# Patient Record
Sex: FEMALE | Race: BLACK OR AFRICAN AMERICAN | NOT HISPANIC OR LATINO | Employment: OTHER | URBAN - METROPOLITAN AREA
[De-identification: names, ages, dates, MRNs, and addresses within clinical notes are randomized per-mention and may not be internally consistent; named-entity substitution may affect disease eponyms.]

---

## 2020-09-24 ENCOUNTER — TELEPHONE (OUTPATIENT)
Dept: OPHTHALMOLOGY | Facility: CLINIC | Age: 70
End: 2020-09-24

## 2020-09-24 NOTE — TELEPHONE ENCOUNTER
The patient called in today. The patient states that she has been having a foreign body sensation in her right eye for a couple of years. The patient states that her eyes itch. She also has an off white discharge from hey eyes in the morning. She also feels that nasally by her right eye is more prominent than her left eye. I offered the patient an appointment to see one of the doctors today or tomorrow, but the patient declined. The patient will call the on call doctor if something changes. Per Dr. Ayers, the patient can use Pataday 2 times a day until the patient sees  on Monday.

## 2020-09-28 ENCOUNTER — OFFICE VISIT (OUTPATIENT)
Dept: OPHTHALMOLOGY | Facility: CLINIC | Age: 70
End: 2020-09-28
Payer: COMMERCIAL

## 2020-09-28 DIAGNOSIS — H01.00B BLEPHARITIS OF UPPER AND LOWER EYELIDS OF BOTH EYES, UNSPECIFIED TYPE: Primary | ICD-10-CM

## 2020-09-28 DIAGNOSIS — H04.129 DRY EYE: ICD-10-CM

## 2020-09-28 DIAGNOSIS — H01.00A BLEPHARITIS OF UPPER AND LOWER EYELIDS OF BOTH EYES, UNSPECIFIED TYPE: Primary | ICD-10-CM

## 2020-09-28 DIAGNOSIS — H26.9 CORTICAL CATARACT OF BOTH EYES: ICD-10-CM

## 2020-09-28 DIAGNOSIS — H25.13 NUCLEAR SCLEROSIS OF BOTH EYES: ICD-10-CM

## 2020-09-28 DIAGNOSIS — H10.13 ALLERGIC CONJUNCTIVITIS OF BOTH EYES: ICD-10-CM

## 2020-09-28 PROCEDURE — 99999 PR PBB SHADOW E&M-EST. PATIENT-LVL I: ICD-10-PCS | Mod: PBBFAC,,, | Performed by: OPHTHALMOLOGY

## 2020-09-28 PROCEDURE — 99999 PR PBB SHADOW E&M-EST. PATIENT-LVL I: CPT | Mod: PBBFAC,,, | Performed by: OPHTHALMOLOGY

## 2020-09-28 PROCEDURE — 92002 PR EYE EXAM, NEW PATIENT,INTERMED: ICD-10-PCS | Mod: S$PBB,,, | Performed by: OPHTHALMOLOGY

## 2020-09-28 PROCEDURE — 92002 INTRM OPH EXAM NEW PATIENT: CPT | Mod: S$PBB,,, | Performed by: OPHTHALMOLOGY

## 2020-09-28 PROCEDURE — 99211 OFF/OP EST MAY X REQ PHY/QHP: CPT | Mod: PBBFAC | Performed by: OPHTHALMOLOGY

## 2020-09-28 NOTE — PROGRESS NOTES
SUBJECTIVE  Cherelle Arreola is 70 y.o. female  Corrected distance visual acuity was 20/50 -2 in the right eye and 20/30 -1 in the left eye.   Chief Complaint   Patient presents with    Eye Problem          HPI     Foreign body sensation right eye, going on for years, worse in the   mornings    Pataday OU    Last edited by Derrell Berry MD on 9/28/2020 10:38 AM.   (History)         Assessment /Plan :  1. Blepharitis of upper and lower eyelids of both eyes, unspecified type Findings and symptoms consistent with blepharitis. The blepharitis instruction sheet was reviewed with the pt, recommending:Warm compresses, Gentle Lid Scrubs and [place] 3 Fish Oils [id number] mg po bid     2. Dry eye Findings and symptoms consistent with dry eyes. Dry eye instruction sheet reviewed with patient recommending:AT's qid/titrate prn     3. Allergic conjunctivitis of both eyes continue Pataday qd OU prn   4. Nuclear sclerosis of both eyes monitor for now   5. Cortical cataract of both eyes monitor for now     RTC for a cataract evaluation prn

## 2020-11-23 ENCOUNTER — TELEPHONE (OUTPATIENT)
Dept: OPHTHALMOLOGY | Facility: CLINIC | Age: 70
End: 2020-11-23

## 2020-11-23 NOTE — TELEPHONE ENCOUNTER
Patient was wondering why her appt was rescheduled. Let pt know that [facility] is off all week and they tried to reach her 10/07 but had no luck. Patient states that she did receive the letter saying they rescheduled her appointment she just wanted to make sure they had the right number on fine.

## 2020-11-23 NOTE — TELEPHONE ENCOUNTER
----- Message from Abril Lopez sent at 11/23/2020  2:41 PM CST -----  Regarding: why was her appt reschedule  Contact: pt  Caller is requesting a  call back regarding why was her appt reschedule..  Please call back at [phone] (home) .  Thanks.

## 2020-11-30 ENCOUNTER — OFFICE VISIT (OUTPATIENT)
Dept: OPHTHALMOLOGY | Facility: CLINIC | Age: 70
End: 2020-11-30
Payer: COMMERCIAL

## 2020-11-30 DIAGNOSIS — H26.9 CORTICAL CATARACT OF BOTH EYES: ICD-10-CM

## 2020-11-30 DIAGNOSIS — H25.13 NUCLEAR SCLEROSIS OF BOTH EYES: Primary | ICD-10-CM

## 2020-11-30 PROCEDURE — 92014 COMPRE OPH EXAM EST PT 1/>: CPT | Mod: S$PBB,,, | Performed by: OPHTHALMOLOGY

## 2020-11-30 PROCEDURE — 99212 OFFICE O/P EST SF 10 MIN: CPT | Mod: PBBFAC | Performed by: OPHTHALMOLOGY

## 2020-11-30 PROCEDURE — 92014 PR EYE EXAM, EST PATIENT,COMPREHESV: ICD-10-PCS | Mod: S$PBB,,, | Performed by: OPHTHALMOLOGY

## 2020-11-30 PROCEDURE — 99999 PR PBB SHADOW E&M-EST. PATIENT-LVL II: CPT | Mod: PBBFAC,,, | Performed by: OPHTHALMOLOGY

## 2020-11-30 PROCEDURE — 99999 PR PBB SHADOW E&M-EST. PATIENT-LVL II: ICD-10-PCS | Mod: PBBFAC,,, | Performed by: OPHTHALMOLOGY

## 2020-11-30 RX ORDER — IBUPROFEN 100 MG/5ML
1000 SUSPENSION, ORAL (FINAL DOSE FORM) ORAL
COMMUNITY

## 2020-11-30 NOTE — PROGRESS NOTES
BLAISE Arreola is 70 y.o. female  Uncorrected distance visual acuity was 20/25 in the right eye and 20/40 in the left eye.   Chief Complaint   Patient presents with    Cataract          HPI     Pt here for cat eval. Pt states she is still experiencing some discomfort   from the dry eyes, but doesn't have the gritty or fb sensation anymore.   She also doesn't have thick discharge anymore. She still has trouble   seeing. Pt states glare issues stop her from driving at night often.     1. NS OU  2. Dry eyes  3. Blepharitis OU    Last edited by Reagan Perry, Patient Care Assistant on 11/30/2020  8:23   AM. (History)         Assessment /Plan :  1. Nuclear sclerosis of both eyes Patient does reports mild visual decline from incipient cataractous changes, but not sufficient to affect activities of daily living. I recommend monitoring visual status and follow up when visual symptoms worsen.     2. Cortical cataract of both eyes Patient does reports mild visual decline from incipient cataractous changes, but not sufficient to affect activities of daily living. I recommend monitoring visual status and follow up when visual symptoms worsen.       Return to clinic in 6 months to evaluate the cataracts  or as needed

## 2021-01-19 ENCOUNTER — IMMUNIZATION (OUTPATIENT)
Dept: INTERNAL MEDICINE | Facility: CLINIC | Age: 71
End: 2021-01-19
Payer: MEDICARE

## 2021-01-19 DIAGNOSIS — Z23 NEED FOR VACCINATION: Primary | ICD-10-CM

## 2021-01-19 PROCEDURE — 91300 COVID-19, MRNA, LNP-S, PF, 30 MCG/0.3 ML DOSE VACCINE: CPT | Mod: PBBFAC

## 2021-02-09 ENCOUNTER — IMMUNIZATION (OUTPATIENT)
Dept: INTERNAL MEDICINE | Facility: CLINIC | Age: 71
End: 2021-02-09
Payer: MEDICARE

## 2021-02-09 DIAGNOSIS — Z23 NEED FOR VACCINATION: Primary | ICD-10-CM

## 2021-02-09 PROCEDURE — 0002A COVID-19, MRNA, LNP-S, PF, 30 MCG/0.3 ML DOSE VACCINE: CPT | Mod: PBBFAC

## 2021-02-09 PROCEDURE — 91300 COVID-19, MRNA, LNP-S, PF, 30 MCG/0.3 ML DOSE VACCINE: CPT | Mod: PBBFAC

## 2021-06-11 ENCOUNTER — OFFICE VISIT (OUTPATIENT)
Dept: OPHTHALMOLOGY | Facility: CLINIC | Age: 71
End: 2021-06-11
Payer: MEDICARE

## 2021-06-11 DIAGNOSIS — H52.7 REFRACTIVE ERROR: ICD-10-CM

## 2021-06-11 DIAGNOSIS — H26.9 CORTICAL CATARACT OF BOTH EYES: ICD-10-CM

## 2021-06-11 DIAGNOSIS — H25.13 NUCLEAR SCLEROSIS OF BOTH EYES: Primary | ICD-10-CM

## 2021-06-11 DIAGNOSIS — Z83.511 FAMILY HISTORY OF GLAUCOMA: ICD-10-CM

## 2021-06-11 PROCEDURE — 99999 PR PBB SHADOW E&M-EST. PATIENT-LVL II: CPT | Mod: PBBFAC,,, | Performed by: OPHTHALMOLOGY

## 2021-06-11 PROCEDURE — 99999 PR PBB SHADOW E&M-EST. PATIENT-LVL II: ICD-10-PCS | Mod: PBBFAC,,, | Performed by: OPHTHALMOLOGY

## 2021-06-11 PROCEDURE — 92014 PR EYE EXAM, EST PATIENT,COMPREHESV: ICD-10-PCS | Mod: S$PBB,,, | Performed by: OPHTHALMOLOGY

## 2021-06-11 PROCEDURE — 92015 PR REFRACTION: ICD-10-PCS | Mod: ,,, | Performed by: OPHTHALMOLOGY

## 2021-06-11 PROCEDURE — 92015 DETERMINE REFRACTIVE STATE: CPT | Mod: ,,, | Performed by: OPHTHALMOLOGY

## 2021-06-11 PROCEDURE — 92014 COMPRE OPH EXAM EST PT 1/>: CPT | Mod: S$PBB,,, | Performed by: OPHTHALMOLOGY

## 2021-06-11 PROCEDURE — 99212 OFFICE O/P EST SF 10 MIN: CPT | Mod: PBBFAC | Performed by: OPHTHALMOLOGY

## 2021-11-06 ENCOUNTER — IMMUNIZATION (OUTPATIENT)
Dept: PRIMARY CARE CLINIC | Facility: CLINIC | Age: 71
End: 2021-11-06
Payer: MEDICARE

## 2021-11-06 DIAGNOSIS — Z23 NEED FOR VACCINATION: Primary | ICD-10-CM

## 2021-11-06 PROCEDURE — 0004A COVID-19, MRNA, LNP-S, PF, 30 MCG/0.3 ML DOSE VACCINE: CPT | Mod: CV19,PBBFAC | Performed by: FAMILY MEDICINE

## 2021-12-06 ENCOUNTER — OFFICE VISIT (OUTPATIENT)
Dept: OPHTHALMOLOGY | Facility: CLINIC | Age: 71
End: 2021-12-06
Payer: MEDICARE

## 2021-12-06 DIAGNOSIS — H26.9 CORTICAL CATARACT OF BOTH EYES: ICD-10-CM

## 2021-12-06 DIAGNOSIS — Z83.511 FAMILY HISTORY OF GLAUCOMA: ICD-10-CM

## 2021-12-06 DIAGNOSIS — H25.13 NUCLEAR SCLEROSIS OF BOTH EYES: Primary | ICD-10-CM

## 2021-12-06 PROCEDURE — 92012 INTRM OPH EXAM EST PATIENT: CPT | Mod: S$PBB,,, | Performed by: OPHTHALMOLOGY

## 2021-12-06 PROCEDURE — 99999 PR PBB SHADOW E&M-EST. PATIENT-LVL II: CPT | Mod: PBBFAC,,, | Performed by: OPHTHALMOLOGY

## 2021-12-06 PROCEDURE — 99999 PR PBB SHADOW E&M-EST. PATIENT-LVL II: ICD-10-PCS | Mod: PBBFAC,,, | Performed by: OPHTHALMOLOGY

## 2021-12-06 PROCEDURE — 92012 PR EYE EXAM, EST PATIENT,INTERMED: ICD-10-PCS | Mod: S$PBB,,, | Performed by: OPHTHALMOLOGY

## 2021-12-06 PROCEDURE — 99212 OFFICE O/P EST SF 10 MIN: CPT | Mod: PBBFAC | Performed by: OPHTHALMOLOGY

## 2022-06-27 ENCOUNTER — IMMUNIZATION (OUTPATIENT)
Dept: PRIMARY CARE CLINIC | Facility: CLINIC | Age: 72
End: 2022-06-27
Payer: MEDICARE

## 2022-06-27 DIAGNOSIS — Z23 NEED FOR VACCINATION: Primary | ICD-10-CM

## 2022-06-27 PROCEDURE — 91305 COVID-19, MRNA, LNP-S, PF, 30 MCG/0.3 ML DOSE VACCINE (PFIZER): CPT | Mod: PBBFAC | Performed by: FAMILY MEDICINE

## 2023-01-23 ENCOUNTER — TELEPHONE (OUTPATIENT)
Dept: OBSTETRICS AND GYNECOLOGY | Facility: CLINIC | Age: 73
End: 2023-01-23
Payer: MEDICARE

## 2023-01-23 NOTE — TELEPHONE ENCOUNTER
----- Message from Makayla Ayon sent at 1/23/2023  2:30 PM CST -----  Shea with [facility] would like to consult with a nurse. The patient is currently at [facility] stating she gets her mammo orders from  I informed nurse the patient hasn't been seen in office since 2016' they are wanting to speak with office to confirm. Please call back at [phone]. Thanks r/s and fax [phone]

## 2023-01-23 NOTE — TELEPHONE ENCOUNTER
Called [facility] and advised patient has not been seen since 2016 and will need annual appointment.  PCP may have been writing orders for mammo, but it was not Dr. Howell.

## 2024-11-20 ENCOUNTER — TELEPHONE (OUTPATIENT)
Dept: OPHTHALMOLOGY | Facility: CLINIC | Age: 74
End: 2024-11-20
Payer: MEDICARE

## 2024-11-20 NOTE — TELEPHONE ENCOUNTER
I returned call to Ms. Arreola.  She did not answer.  I left a voicemail stating that we can schedule her with Dr. Cancino or Dr. Vega and she can give us a call back to let us know If that is ok and get her scheduled.

## 2024-11-20 NOTE — TELEPHONE ENCOUNTER
----- Message from [employer] sent at 11/20/2024  3:50 PM CST -----  Contact: SUN HERNÁNDEZ [[id number]]  .Type:  Patient Requesting Call    Who Called:SUN HERNÁNDEZ [[id number]]  Does the patient know what this is regarding?:scheduling  Would the patient rather a call back or a response via [facility]? call  Best Call Back Number:.[phone] (home)     Additional Information:

## 2025-04-17 ENCOUNTER — TELEPHONE (OUTPATIENT)
Dept: OPHTHALMOLOGY | Facility: CLINIC | Age: 75
End: 2025-04-17
Payer: MEDICARE

## 2025-04-17 NOTE — TELEPHONE ENCOUNTER
----- Message from Escobar sent at 4/17/2025  2:10 PM CDT -----  Contact: Pt  .Type:  Sooner Apoointment RequestCaller is requesting a sooner appointment.  Caller declined first available appointment listed below.  Caller will not accept being placed on the waitlist and is requesting a message be sent to doctor.Name of Caller:ptWhen is the first available appointment?Symptoms: routine eye examWould the patient rather a call back or a response via [facility]?  Call [facility] Call Back Number: [phone] Additional Information:

## 2025-04-17 NOTE — TELEPHONE ENCOUNTER
Called pt back and left a message to reschedule with another on e of her doctors due to [facility] retiring.

## 2025-04-28 ENCOUNTER — OFFICE VISIT (OUTPATIENT)
Dept: OTOLARYNGOLOGY | Facility: CLINIC | Age: 75
End: 2025-04-28
Payer: MEDICARE

## 2025-04-28 VITALS — HEIGHT: 63 IN | BODY MASS INDEX: 32.41 KG/M2

## 2025-04-28 DIAGNOSIS — R13.10 DYSPHAGIA, UNSPECIFIED TYPE: ICD-10-CM

## 2025-04-28 DIAGNOSIS — K21.9 GASTROESOPHAGEAL REFLUX DISEASE WITHOUT ESOPHAGITIS: ICD-10-CM

## 2025-04-28 DIAGNOSIS — J02.9 SORE THROAT: Primary | ICD-10-CM

## 2025-04-28 PROCEDURE — 99203 OFFICE O/P NEW LOW 30 MIN: CPT | Mod: S$PBB,,, | Performed by: PHYSICIAN ASSISTANT

## 2025-04-28 PROCEDURE — 99999 PR PBB SHADOW E&M-EST. PATIENT-LVL III: CPT | Mod: PBBFAC,,, | Performed by: PHYSICIAN ASSISTANT

## 2025-04-28 PROCEDURE — 99213 OFFICE O/P EST LOW 20 MIN: CPT | Mod: PBBFAC | Performed by: PHYSICIAN ASSISTANT

## 2025-04-28 NOTE — PROGRESS NOTES
"Subjective:   Patient ID: Cherelle Arreola is a 75 y.o. female.    Chief Complaint: Sore Throat (Ongoing for about a couple of months. She states she would wake up at night and it feels like someone is cutting her throat. Her throat feels very raw.)    Ms. Arreola is a very pleasant 74 yo female here to see em today with burning sensation in throat at night, occasional pain with swallowing and coughing x 3-4 months. She does have a h/o GERD and is managed with prilosec. She quit smoking 30+ years ago. She occasionally coughs up "thick mucus."     Sore Throat   Pertinent negatives include no abdominal pain, congestion, coughing, ear discharge, ear pain, headaches, shortness of breath or trouble swallowing.     Review of patient's allergies indicates:   Allergen Reactions    Ace inhibitors Other (See Comments)    Penicillins Other (See Comments)    Sulfamethoxazole-trimethoprim Other (See Comments)           Review of Systems   Constitutional:  Negative for chills, fatigue, fever and unexpected weight change.   HENT:  Positive for sore throat. Negative for congestion, dental problem, ear discharge, ear pain, facial swelling, hearing loss, nosebleeds, postnasal drip, rhinorrhea, sinus pressure, sneezing, tinnitus, trouble swallowing and voice change.    Eyes:  Negative for redness, itching and visual disturbance.   Respiratory:  Negative for cough, choking, shortness of breath and wheezing.    Cardiovascular:  Negative for chest pain and palpitations.   Gastrointestinal:  Negative for abdominal pain.        No reflux.   Musculoskeletal:  Negative for gait problem.   Skin:  Negative for rash.   Neurological:  Negative for dizziness, light-headedness and headaches.         Objective:   Ht 5' 3" (1.6 m)   BMI 32.41 kg/m²     Physical Exam  Constitutional:       General: She is not in acute distress.     Appearance: She is well-developed.   HENT:      Head: Normocephalic and atraumatic.      Right Ear: Tympanic membrane, ear " canal and external ear normal.      Left Ear: Tympanic membrane, ear canal and external ear normal.      Nose: Nose normal. No nasal deformity, septal deviation, mucosal edema or rhinorrhea.      Right Sinus: No maxillary sinus tenderness or frontal sinus tenderness.      Left Sinus: No maxillary sinus tenderness or frontal sinus tenderness.      Mouth/Throat:      Mouth: Mucous membranes are not pale and not dry.      Dentition: No dental caries.      Pharynx: Uvula midline. No oropharyngeal exudate or posterior oropharyngeal erythema.   Eyes:      General: Lids are normal. No scleral icterus.     Extraocular Movements:      Right eye: Normal extraocular motion and no nystagmus.      Left eye: Normal extraocular motion and no nystagmus.      Conjunctiva/sclera: Conjunctivae normal.      Right eye: Right conjunctiva is not injected. No chemosis.     Left eye: Left conjunctiva is not injected. No chemosis.     Pupils: Pupils are equal, round, and reactive to light.   Neck:      Thyroid: No thyroid mass or thyromegaly.      Trachea: Trachea and phonation normal. No tracheal tenderness or tracheal deviation.   Pulmonary:      Effort: Pulmonary effort is normal. No respiratory distress.      Breath sounds: No stridor.   Abdominal:      General: There is no distension.   Lymphadenopathy:      Head:      Right side of head: No submental, submandibular, preauricular, posterior auricular or occipital adenopathy.      Left side of head: No submental, submandibular, preauricular, posterior auricular or occipital adenopathy.      Cervical: No cervical adenopathy.   Skin:     General: Skin is warm and dry.      Findings: No erythema or rash.   Neurological:      Mental Status: She is alert and oriented to person, place, and time.      Cranial Nerves: No cranial nerve deficit.   Psychiatric:         Behavior: Behavior normal.              Assessment:     1. Sore throat    2. Gastroesophageal reflux disease without esophagitis     3. Dysphagia, unspecified type        Plan:     Sore throat    Gastroesophageal reflux disease without esophagitis    Dysphagia, unspecified type      Will plan to have her return for scope evaluation. Discussed reflux precautions. May need increase in medications.

## 2025-05-08 ENCOUNTER — OFFICE VISIT (OUTPATIENT)
Dept: OTOLARYNGOLOGY | Facility: CLINIC | Age: 75
End: 2025-05-08
Payer: MEDICARE

## 2025-05-08 VITALS — HEIGHT: 63 IN | BODY MASS INDEX: 32.41 KG/M2

## 2025-05-08 DIAGNOSIS — R13.10 DYSPHAGIA, UNSPECIFIED TYPE: ICD-10-CM

## 2025-05-08 DIAGNOSIS — K21.9 LARYNGOPHARYNGEAL REFLUX (LPR): Primary | ICD-10-CM

## 2025-05-08 DIAGNOSIS — J02.9 SORE THROAT: ICD-10-CM

## 2025-05-08 PROCEDURE — 31575 DIAGNOSTIC LARYNGOSCOPY: CPT | Mod: PBBFAC | Performed by: OTOLARYNGOLOGY

## 2025-05-08 PROCEDURE — 99213 OFFICE O/P EST LOW 20 MIN: CPT | Mod: PBBFAC | Performed by: OTOLARYNGOLOGY

## 2025-05-08 PROCEDURE — 99999 PR PBB SHADOW E&M-EST. PATIENT-LVL III: CPT | Mod: PBBFAC,,, | Performed by: OTOLARYNGOLOGY

## 2025-05-08 RX ORDER — PANTOPRAZOLE SODIUM 40 MG/1
40 TABLET, DELAYED RELEASE ORAL DAILY
Qty: 30 TABLET | Refills: 5 | Status: SHIPPED | OUTPATIENT
Start: 2025-05-08 | End: 2026-05-08

## 2025-05-08 NOTE — PATIENT INSTRUCTIONS
Laryngopharyngeal Reflux (LPR) Patient Information and Medication Instructions    LPR (laryngopharyngeal reflux) can be a challenging condition to control.  Some patients require once daily medication like a proton pump inhibitor to control their symptoms (such as Omeprazole, Pantoprazole, Esomeprazole).  HOWEVER, other patients will require taking this medication twice daily and even may be started on another medication called an H2 blocker (such as Pepcid, Zantac) at bedtime.    It is important that medication is not the only technique that you use to help control your LPR.  Therapeutic lifestyle changes are very important as well:     Weight Loss:  If you are overweight, losing weight can significantly reduce your reflux.  Diet Control:  It is important to determine what your Trigger foods for reflux are.  Limiting your intake of these foods will significantly improve your reflux.  Examples of foods known to increase reflux are listed below  Carbonated beverages  Caffeine (this includes Coffee, soda, iced tea, energy drinks etc.)  Alcohol  Fried/ fatty/ greasy foods  Acidic foods (citrus, tomato etc.)  Chocolate  Spearmint/Peppermint  Elevating the head of your bed:  This doesn't mean more pillows.  You need to actually elevate the head of your bed.  Some patients have found something to put under the legs of the head of their bed.  Other patients have employed a wedge or an air bladder of some sort to elevate the head of their bed.  This makes a significant difference with reflux and can also help with nasal congestion.  Eating before bedtime:  Try not to eat anything for at least 2 hours before bedtime.  This allows for less material to remain in your stomach when you lay down at night which equals less severe reflux.  More information:  If you would like to read more about diet changes and lifestyle changes that you can employ that will help with your reflux, the books below may be helpful.  Dropping Acid:   The Reflux diet Cookbook & Cure by Ravinder Flynn M.D. and Elmer Epperson M.D.  The Acid Watcher Diet:  A 28 Day Reflux Prevention and Healing Program by Reagan Snowden M.D.      Weaning Instructions After Symptom Improvement    It can sometimes take up to 12 weeks to see symptom improvement in LPR.  The medications may reduce the amount of acid you are producing very quickly, but then the tissues of your voice box and upper throat have to heal themselves.  Your physician may have increased year proton pump inhibitor to twice daily dosing and even may have added an H2 blocker at bedtime.  Eventually, the goal is a complete resolution of your symptoms.  Hopefully you have been working on the lifestyle modifications listed above over this time period as well!    Once your symptoms have improved, our goal is to wean you back off of your reflux medication.  The instructions below will help guide you through this process.    Take all anti-reflux medications for at least 3 weeks beyond when your symptoms have improved/resolved  If you are on Twice daily dosing of a proton pump inhibitor (omeprazole, pantoprazole, esomeprazole etc.) and an H2 blocker at bedtime (pepcid, zantac) then you should first discontinue your night time dose of your proton pump inhibitor.  If your symptoms are still controlled after 3 weeks of taking your PPI in the morning and your H2 blocker at bedtime,  discontinue your bedtime H2 blocker  If your symptoms are still controlled after 3 more weeks of only taking your PPI in the morning, discontinue your morning PPI    If at any point your symptoms return during this weaning process, you can return to the previous step and let your physician know at the next appointment.

## 2025-05-08 NOTE — PROGRESS NOTES
"Subjective:   Patient ID: Cherelle Arreola is a 75 y.o. female.    Chief Complaint: Dysphagia (Follow up per milagros for dysphagia. Patient states she has a feeling of something sitting in her throat that is cutting her. Raw like feeling. She c/o coughing and hacking up flem.)    Ms. Arreola is a very pleasant 74 yo female here to see em today with burning sensation in throat at night, occasional pain with swallowing and coughing x 3-4 months. She does have a h/o GERD and is managed with prilosec. She quit smoking 30+ years ago. She occasionally coughs up "thick mucus."     5/8/25 update:   here for follow-up and scope exam.  Symptoms are unchanged    Sore Throat   Pertinent negatives include no abdominal pain, congestion, coughing, ear discharge, ear pain, headaches, shortness of breath or trouble swallowing.     Review of patient's allergies indicates:   Allergen Reactions    Ace inhibitors Other (See Comments)    Penicillins Other (See Comments)    Sulfamethoxazole-trimethoprim Other (See Comments)           Review of Systems   Constitutional:  Negative for chills, fatigue, fever and unexpected weight change.   HENT:  Positive for sore throat. Negative for congestion, dental problem, ear discharge, ear pain, facial swelling, hearing loss, nosebleeds, postnasal drip, rhinorrhea, sinus pressure, sneezing, tinnitus, trouble swallowing and voice change.    Eyes:  Negative for redness, itching and visual disturbance.   Respiratory:  Negative for cough, choking, shortness of breath and wheezing.    Cardiovascular:  Negative for chest pain and palpitations.   Gastrointestinal:  Negative for abdominal pain.        No reflux.   Musculoskeletal:  Negative for gait problem.   Skin:  Negative for rash.   Neurological:  Negative for dizziness, light-headedness and headaches.         Objective:   Ht 5' 3" (1.6 m)   BMI 32.41 kg/m²     Physical Exam  Constitutional:       General: She is not in acute distress.     Appearance: She is " well-developed.   HENT:      Head: Normocephalic and atraumatic.      Right Ear: Tympanic membrane, ear canal and external ear normal.      Left Ear: Tympanic membrane, ear canal and external ear normal.      Nose: Nose normal. No nasal deformity, septal deviation, mucosal edema or rhinorrhea.      Right Sinus: No maxillary sinus tenderness or frontal sinus tenderness.      Left Sinus: No maxillary sinus tenderness or frontal sinus tenderness.      Mouth/Throat:      Mouth: Mucous membranes are not pale and not dry.      Dentition: No dental caries.      Pharynx: Uvula midline. No oropharyngeal exudate or posterior oropharyngeal erythema.   Eyes:      General: Lids are normal. No scleral icterus.     Extraocular Movements:      Right eye: Normal extraocular motion and no nystagmus.      Left eye: Normal extraocular motion and no nystagmus.      Conjunctiva/sclera: Conjunctivae normal.      Right eye: Right conjunctiva is not injected. No chemosis.     Left eye: Left conjunctiva is not injected. No chemosis.     Pupils: Pupils are equal, round, and reactive to light.   Neck:      Thyroid: No thyroid mass or thyromegaly.      Trachea: Trachea and phonation normal. No tracheal tenderness or tracheal deviation.   Pulmonary:      Effort: Pulmonary effort is normal. No respiratory distress.      Breath sounds: No stridor.   Abdominal:      General: There is no distension.   Lymphadenopathy:      Head:      Right side of head: No submental, submandibular, preauricular, posterior auricular or occipital adenopathy.      Left side of head: No submental, submandibular, preauricular, posterior auricular or occipital adenopathy.      Cervical: No cervical adenopathy.   Skin:     General: Skin is warm and dry.      Findings: No erythema or rash.   Neurological:      Mental Status: She is alert and oriented to person, place, and time.      Cranial Nerves: No cranial nerve deficit.   Psychiatric:         Behavior: Behavior normal.         Due to indication in patient's history, presentation or risk factors,  a fiber optic exam was performed.    SEPARATE PROCEDURE NOTE:    ANESTHESIA:  Topical xylocaine with shalini-synephrine  FINDINGS:  Moderate to severe inaterarytenoid erythema and edema    PROCEDURE:  After verbal consent was obtained, the flexible scope was passed through the patient's nasal cavity without difficulty.  The nasopharynx (adenoid pad) and eustachian tube orifices were first visualized and were found to be normal, without masses or irregularity.  The posterior pharyngeal wall and base of tongue were then examined and no mass or irregular tissue was seen.  The scope was then advanced to the larynx, and the epiglottis, valleculae, and piriform sinuses were normal, without masses or mucosal irregularity.  The false vocal folds and true vocal folds were then examined and were found to have normal mobility (full abduction and adduction) and no masses or mucosal irregularity was seen.  The interartyenoid area had moderate to severe edema and erythema consistent with reflux. Visualized portions of the subglottis were also noted to be normal in appearance with normal tracheal mucosa.        Assessment:     1. Laryngopharyngeal reflux (LPR)    2. Dysphagia, unspecified type    3. Sore throat          Plan:     Laryngopharyngeal reflux (LPR)  -     pantoprazole (PROTONIX) 40 MG tablet; Take 1 tablet (40 mg total) by mouth once daily.  Dispense: 30 tablet; Refill: 5    Dysphagia, unspecified type    Sore throat        Will plan to have her return for scope evaluation. Discussed reflux precautions. May need increase in medications.     5/8/25 update:   history and endoscopy findings are consistent with inadequately controlled LPR.  I recommended increasing her pantoprazole to 40 mg daily and therapeutic lifestyle measures as noted below.  She will follow-up in 12 weeks for recheck    Laryngopharyngeal Reflux (LPR) Patient Information and  Medication Instructions    LPR (laryngopharyngeal reflux) can be a challenging condition to control.  Some patients require once daily medication like a proton pump inhibitor to control their symptoms (such as Omeprazole, Pantoprazole, Esomeprazole).  HOWEVER, other patients will require taking this medication twice daily and even may be started on another medication called an H2 blocker (such as Pepcid, Zantac) at bedtime.    It is important that medication is not the only technique that you use to help control your LPR.  Therapeutic lifestyle changes are very important as well:     Weight Loss:  If you are overweight, losing weight can significantly reduce your reflux.  Diet Control:  It is important to determine what your Trigger foods for reflux are.  Limiting your intake of these foods will significantly improve your reflux.  Examples of foods known to increase reflux are listed below  Carbonated beverages  Caffeine (this includes Coffee, soda, iced tea, energy drinks etc.)  Alcohol  Fried/ fatty/ greasy foods  Acidic foods (citrus, tomato etc.)  Chocolate  Spearmint/Peppermint  Elevating the head of your bed:  This doesn't mean more pillows.  You need to actually elevate the head of your bed.  Some patients have found something to put under the legs of the head of their bed.  Other patients have employed a wedge or an air bladder of some sort to elevate the head of their bed.  This makes a significant difference with reflux and can also help with nasal congestion.  Eating before bedtime:  Try not to eat anything for at least 2 hours before bedtime.  This allows for less material to remain in your stomach when you lay down at night which equals less severe reflux.  More information:  If you would like to read more about diet changes and lifestyle changes that you can employ that will help with your reflux, the books below may be helpful.  Dropping Acid:  The Reflux diet Cookbook & Cure by Ravinder Flynn M.D. and  Elmer Epperson M.D.  The Acid Watcher Diet:  A 28 Day Reflux Prevention and Healing Program by Reagan Snowden M.D.      Weaning Instructions After Symptom Improvement    It can sometimes take up to 12 weeks to see symptom improvement in LPR.  The medications may reduce the amount of acid you are producing very quickly, but then the tissues of your voice box and upper throat have to heal themselves.  Your physician may have increased year proton pump inhibitor to twice daily dosing and even may have added an H2 blocker at bedtime.  Eventually, the goal is a complete resolution of your symptoms.  Hopefully you have been working on the lifestyle modifications listed above over this time period as well!    Once your symptoms have improved, our goal is to wean you back off of your reflux medication.  The instructions below will help guide you through this process.    Take all anti-reflux medications for at least 3 weeks beyond when your symptoms have improved/resolved  If you are on Twice daily dosing of a proton pump inhibitor (omeprazole, pantoprazole, esomeprazole etc.) and an H2 blocker at bedtime (pepcid, zantac) then you should first discontinue your night time dose of your proton pump inhibitor.  If your symptoms are still controlled after 3 weeks of taking your PPI in the morning and your H2 blocker at bedtime,  discontinue your bedtime H2 blocker  If your symptoms are still controlled after 3 more weeks of only taking your PPI in the morning, discontinue your morning PPI    If at any point your symptoms return during this weaning process, you can return to the previous step and let your physician know at the next appointment.

## 2025-07-31 ENCOUNTER — OFFICE VISIT (OUTPATIENT)
Dept: OTOLARYNGOLOGY | Facility: CLINIC | Age: 75
End: 2025-07-31
Payer: MEDICARE

## 2025-07-31 VITALS — WEIGHT: 183 LBS | HEIGHT: 63 IN | BODY MASS INDEX: 32.43 KG/M2

## 2025-07-31 DIAGNOSIS — R13.10 DYSPHAGIA, UNSPECIFIED TYPE: Primary | ICD-10-CM

## 2025-07-31 DIAGNOSIS — K21.9 LARYNGOPHARYNGEAL REFLUX (LPR): ICD-10-CM

## 2025-07-31 PROCEDURE — 99213 OFFICE O/P EST LOW 20 MIN: CPT | Mod: S$PBB,,, | Performed by: OTOLARYNGOLOGY

## 2025-07-31 PROCEDURE — 99213 OFFICE O/P EST LOW 20 MIN: CPT | Mod: PBBFAC | Performed by: OTOLARYNGOLOGY

## 2025-07-31 PROCEDURE — 99999 PR PBB SHADOW E&M-EST. PATIENT-LVL III: CPT | Mod: PBBFAC,,, | Performed by: OTOLARYNGOLOGY

## 2025-07-31 NOTE — PROGRESS NOTES
"Subjective:   Patient ID: Cherelle Arreola is a 75 y.o. female.    Chief Complaint: Follow-up (Pt is coming in for 3 month f/u dysphagia pt states she is coughing up clear mucus in the morning and still having trouble swallowing certain food for example (chicken/rice)    Ms. Arreola is a very pleasant 76 yo female here to see em today with burning sensation in throat at night, occasional pain with swallowing and coughing x 3-4 months. She does have a h/o GERD and is managed with prilosec. She quit smoking 30+ years ago. She occasionally coughs up "thick mucus."     5/8/25 update:   here for follow-up and scope exam.  Symptoms are unchanged    7/31/25 update:  Reflux symptoms fluctuate, still having dysphagia - more for large pills and certain foods like chicken/rice.      Sore Throat   Pertinent negatives include no abdominal pain, congestion, coughing, ear discharge, ear pain, headaches, shortness of breath or trouble swallowing.     Review of patient's allergies indicates:   Allergen Reactions    Ace inhibitors Other (See Comments)    Penicillins Other (See Comments)    Sulfamethoxazole-trimethoprim Other (See Comments)           Review of Systems   Constitutional:  Negative for chills, fatigue, fever and unexpected weight change.   HENT:  Positive for sore throat. Negative for congestion, dental problem, ear discharge, ear pain, facial swelling, hearing loss, nosebleeds, postnasal drip, rhinorrhea, sinus pressure, sneezing, tinnitus, trouble swallowing and voice change.    Eyes:  Negative for redness, itching and visual disturbance.   Respiratory:  Negative for cough, choking, shortness of breath and wheezing.    Cardiovascular:  Negative for chest pain and palpitations.   Gastrointestinal:  Negative for abdominal pain.        No reflux.   Musculoskeletal:  Negative for gait problem.   Skin:  Negative for rash.   Neurological:  Negative for dizziness, light-headedness and headaches.         Objective:   Ht 5' 3" (1.6 " m)   Wt 83 kg (182 lb 15.7 oz)   BMI 32.41 kg/m²     Physical Exam  Constitutional:       General: She is not in acute distress.     Appearance: She is well-developed.   HENT:      Head: Normocephalic and atraumatic.      Right Ear: Tympanic membrane, ear canal and external ear normal.      Left Ear: Tympanic membrane, ear canal and external ear normal.      Nose: Nose normal. No nasal deformity, septal deviation, mucosal edema or rhinorrhea.      Right Sinus: No maxillary sinus tenderness or frontal sinus tenderness.      Left Sinus: No maxillary sinus tenderness or frontal sinus tenderness.      Mouth/Throat:      Mouth: Mucous membranes are not pale and not dry.      Dentition: No dental caries.      Pharynx: Uvula midline. No oropharyngeal exudate or posterior oropharyngeal erythema.   Eyes:      General: Lids are normal. No scleral icterus.     Extraocular Movements:      Right eye: Normal extraocular motion and no nystagmus.      Left eye: Normal extraocular motion and no nystagmus.      Conjunctiva/sclera: Conjunctivae normal.      Right eye: Right conjunctiva is not injected. No chemosis.     Left eye: Left conjunctiva is not injected. No chemosis.     Pupils: Pupils are equal, round, and reactive to light.   Neck:      Thyroid: No thyroid mass or thyromegaly.      Trachea: Trachea and phonation normal. No tracheal tenderness or tracheal deviation.   Pulmonary:      Effort: Pulmonary effort is normal. No respiratory distress.      Breath sounds: No stridor.   Abdominal:      General: There is no distension.   Lymphadenopathy:      Head:      Right side of head: No submental, submandibular, preauricular, posterior auricular or occipital adenopathy.      Left side of head: No submental, submandibular, preauricular, posterior auricular or occipital adenopathy.      Cervical: No cervical adenopathy.   Skin:     General: Skin is warm and dry.      Findings: No erythema or rash.   Neurological:      Mental  Status: She is alert and oriented to person, place, and time.      Cranial Nerves: No cranial nerve deficit.   Psychiatric:         Behavior: Behavior normal.              Assessment:     1. Dysphagia, unspecified type    2. Laryngopharyngeal reflux (LPR)            Plan:     Dysphagia, unspecified type  -     Fl Modified Barium Swallow Speech; Future; Expected date: 07/31/2025  -     SLP video swallow; Future    Laryngopharyngeal reflux (LPR)          Will plan to have her return for scope evaluation. Discussed reflux precautions. May need increase in medications.     5/8/25 update:   history and endoscopy findings are consistent with inadequately controlled LPR.  I recommended increasing her pantoprazole to 40 mg daily and therapeutic lifestyle measures as noted below.  She will follow-up in 12 weeks for recheck    7/31/25 update:  Agreed to move forward with MBSS to evaluate swallowing function.  She understands that she may need a repeat EGD.  She has a history of hiatal hernia and esophageal rings    Laryngopharyngeal Reflux (LPR) Patient Information and Medication Instructions    LPR (laryngopharyngeal reflux) can be a challenging condition to control.  Some patients require once daily medication like a proton pump inhibitor to control their symptoms (such as Omeprazole, Pantoprazole, Esomeprazole).  HOWEVER, other patients will require taking this medication twice daily and even may be started on another medication called an H2 blocker (such as Pepcid, Zantac) at bedtime.    It is important that medication is not the only technique that you use to help control your LPR.  Therapeutic lifestyle changes are very important as well:     Weight Loss:  If you are overweight, losing weight can significantly reduce your reflux.  Diet Control:  It is important to determine what your Trigger foods for reflux are.  Limiting your intake of these foods will significantly improve your reflux.  Examples of foods known to  increase reflux are listed below  Carbonated beverages  Caffeine (this includes Coffee, soda, iced tea, energy drinks etc.)  Alcohol  Fried/ fatty/ greasy foods  Acidic foods (citrus, tomato etc.)  Chocolate  Spearmint/Peppermint  Elevating the head of your bed:  This doesn't mean more pillows.  You need to actually elevate the head of your bed.  Some patients have found something to put under the legs of the head of their bed.  Other patients have employed a wedge or an air bladder of some sort to elevate the head of their bed.  This makes a significant difference with reflux and can also help with nasal congestion.  Eating before bedtime:  Try not to eat anything for at least 2 hours before bedtime.  This allows for less material to remain in your stomach when you lay down at night which equals less severe reflux.  More information:  If you would like to read more about diet changes and lifestyle changes that you can employ that will help with your reflux, the books below may be helpful.  Dropping Acid:  The Reflux diet Cookbook & Cure by Ravinder Flynn M.D. and Elmer Epperson M.D.  The Acid Watcher Diet:  A 28 Day Reflux Prevention and Healing Program by Reagan Snowden M.D.      Weaning Instructions After Symptom Improvement    It can sometimes take up to 12 weeks to see symptom improvement in LPR.  The medications may reduce the amount of acid you are producing very quickly, but then the tissues of your voice box and upper throat have to heal themselves.  Your physician may have increased year proton pump inhibitor to twice daily dosing and even may have added an H2 blocker at bedtime.  Eventually, the goal is a complete resolution of your symptoms.  Hopefully you have been working on the lifestyle modifications listed above over this time period as well!    Once your symptoms have improved, our goal is to wean you back off of your reflux medication.  The instructions below will help guide you through this  process.    Take all anti-reflux medications for at least 3 weeks beyond when your symptoms have improved/resolved  If you are on Twice daily dosing of a proton pump inhibitor (omeprazole, pantoprazole, esomeprazole etc.) and an H2 blocker at bedtime (pepcid, zantac) then you should first discontinue your night time dose of your proton pump inhibitor.  If your symptoms are still controlled after 3 weeks of taking your PPI in the morning and your H2 blocker at bedtime,  discontinue your bedtime H2 blocker  If your symptoms are still controlled after 3 more weeks of only taking your PPI in the morning, discontinue your morning PPI    If at any point your symptoms return during this weaning process, you can return to the previous step and let your physician know at the next appointment.

## 2025-08-13 ENCOUNTER — PATIENT MESSAGE (OUTPATIENT)
Dept: OTOLARYNGOLOGY | Facility: CLINIC | Age: 75
End: 2025-08-13
Payer: MEDICARE